# Patient Record
Sex: MALE | Race: WHITE
[De-identification: names, ages, dates, MRNs, and addresses within clinical notes are randomized per-mention and may not be internally consistent; named-entity substitution may affect disease eponyms.]

---

## 2018-01-24 ENCOUNTER — HOSPITAL ENCOUNTER (OUTPATIENT)
Dept: HOSPITAL 47 - RADXRMAIN | Age: 38
Discharge: HOME | End: 2018-01-24
Payer: MEDICARE

## 2018-01-24 DIAGNOSIS — M47.815: Primary | ICD-10-CM

## 2018-01-24 DIAGNOSIS — M46.05: ICD-10-CM

## 2018-01-24 PROCEDURE — 72110 X-RAY EXAM L-2 SPINE 4/>VWS: CPT

## 2018-01-24 PROCEDURE — 72072 X-RAY EXAM THORAC SPINE 3VWS: CPT

## 2018-01-24 NOTE — XR
EXAMINATION TYPE: XR lumbosacral spine min 4V

 

DATE OF EXAM: 1/24/2018

 

CLINICAL HISTORY: Back pain for years.

 

TECHNIQUE: Frontal, lateral, and oblique images of the lumbar spine are obtained.

 

COMPARISON: None

 

FINDINGS:  There are 5 lumbar type vertebral bodies identified.  The lumbar spine shows satisfactory 
alignment without evidence of acute fracture or dislocation. There is mild disc space narrowing and a
nterior spurring T12-L1 level. Vertebral body heights and disk space heights otherwise are within nor
mal limits.  Mild anterior spurring superior anterior L3, L4, and L5 endplates is present The oblique
 images appear within normal limits.  The overlying soft tissue appears unremarkable.

 

IMPRESSION: Mild anterior spurring mid to lower lumbar levels and mild degenerative change T12-L1 lev
el.

## 2018-09-02 NOTE — XR
EXAMINATION TYPE: XR thoracic spine complete

 

DATE OF EXAM: 1/24/2018

 

CLINICAL HISTORY: Back pain for years.

 

TECHNIQUE: Frontal, lateral, and swimmer's view of thoracic spine are obtained.  

 

COMPARISON: None.

 

FINDINGS: Thoracic spine show satisfactory alignment without evidence of acute fracture or dislocatio
n.  Vertebral body heights and disc space heights are preserved. Minimal multilevel anterior spurring
 is seen mid thoracic spine. Visualized ribs are unremarkable bilaterally.   

 

IMPRESSION: Minimal multilevel anterior spurring mid thoracic spine. No

## 2020-10-27 ENCOUNTER — HOSPITAL ENCOUNTER (EMERGENCY)
Dept: HOSPITAL 47 - EC | Age: 40
Discharge: HOME | End: 2020-10-27
Payer: MEDICARE

## 2020-10-27 VITALS — RESPIRATION RATE: 18 BRPM

## 2020-10-27 VITALS — TEMPERATURE: 98 F | SYSTOLIC BLOOD PRESSURE: 139 MMHG | HEART RATE: 67 BPM | DIASTOLIC BLOOD PRESSURE: 99 MMHG
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
## 2020-10-27 DIAGNOSIS — S39.012A: Primary | ICD-10-CM

## 2020-10-27 DIAGNOSIS — F31.9: ICD-10-CM

## 2020-10-27 DIAGNOSIS — Z79.899: ICD-10-CM
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
## 2020-10-27 DIAGNOSIS — I10: ICD-10-CM

## 2020-10-27 PROCEDURE — 96372 THER/PROPH/DIAG INJ SC/IM: CPT

## 2020-10-27 PROCEDURE — 99283 EMERGENCY DEPT VISIT LOW MDM: CPT

## 2020-10-27 PROCEDURE — 72110 X-RAY EXAM L-2 SPINE 4/>VWS: CPT

## 2020-10-27 NOTE — ED
Back Pain HPI





- General


Chief Complaint: Back Pain/Injury


Stated Complaint: Back pain


Time Seen by Provider: 10/27/20 15:14


Source: patient, family, RN notes reviewed


Limitations: no limitations





- History of Present Illness


Initial Comments: 





This is a 40-year-old male presents emergency Department chief complaint of back

pain 2 days.  Patient states he was working on his car states that he spent 

over pushing her ramp coming to tired when he felt his back pop.  He states he 

had immediate symptoms radiate down his left leg.  Denies any bowel, bladder 

cancer retention.  Denies any difficulty and bleeding.  He states that he has 

pain areas on his leg intermittently.  Denies any saddle anesthesias.  Patient 

has chronic back issues in which she has had multiple injections and has been 

seen by Dr. Gaitan.  Patient states that he has not been referred to Dr. Albert 

for pain management.  Patient has no mental abdominal pain.  Denies any dysuria 

hematuria no fevers or chills.





- Related Data


                                Home Medications











 Medication  Instructions  Recorded  Confirmed


 


Lithium Carbonate 600 mg PO BID 08/01/15 08/01/15


 


Omeprazole [PriLOSEC] 20 mg PO AC-BID 08/01/15 08/01/15








                                  Previous Rx's











 Medication  Instructions  Recorded


 


Cephalexin [Keflex] 500 mg PO Q6HR #32 cap 08/01/15


 


Ibuprofen [Motrin] 800 mg PO Q8HR PRN #20 tab 08/01/15


 


HYDROcodone/APAP 7.5-325MG [Norco 1 tab PO Q6HR PRN 3 Days #12 tab 10/27/20





7.5-325]  


 


Ibuprofen [Motrin] 800 mg PO Q6HR #20 tab 10/27/20


 


predniSONE 50 mg PO DAILY #5 tab 10/27/20











                                    Allergies











Allergy/AdvReac Type Severity Reaction Status Date / Time


 


No Known Allergies Allergy   Verified 10/27/20 15:07














Review of Systems


ROS Statement: 


Those systems with pertinent positive or pertinent negative responses have been 

documented in the HPI.





ROS Other: All systems not noted in ROS Statement are negative.





Past Medical History


Past Medical History: No Reported History


History of Any Multi-Drug Resistant Organisms: None Reported


Past Surgical History: No Surgical Hx Reported


Past Psychological History: Bipolar, Depression


Smoking Status: Current some day smoker


Past Alcohol Use History: Occasional


Past Drug Use History: None Reported





General Exam


Limitations: no limitations


General appearance: alert, in no apparent distress


Head exam: Present: atraumatic, normocephalic, normal inspection


Eye exam: Present: normal appearance, PERRL, EOMI.  Absent: scleral icterus, 

conjunctival injection, periorbital swelling


ENT exam: Present: normal exam, mucous membranes moist


Neck exam: Present: normal inspection, full ROM.  Absent: tenderness, 

meningismus, lymphadenopathy


Respiratory exam: Present: normal lung sounds bilaterally.  Absent: respiratory 

distress, wheezes, rales, rhonchi, stridor


Cardiovascular Exam: Present: regular rate, normal rhythm, normal heart sounds. 

 Absent: systolic murmur, diastolic murmur, rubs, gallop, clicks


GI/Abdominal exam: Present: soft, normal bowel sounds.  Absent: distended, 

tenderness, guarding, rebound, rigid


Extremities exam: Present: other (Lower extremity strength equal bilaterally, 

equal color equal warmth neurovascular intact,.)


Back exam: Present: normal inspection, full ROM (Mild discomfort), tenderness 

(Left lower lumbar), paraspinal tenderness.  Absent: CVA tenderness (R), CVA 

tenderness (L), vertebral tenderness


Neurological exam: Present: reflexes normal.  Absent: motor sensory deficit





Course


                                   Vital Signs











  10/27/20 10/27/20





  15:05 15:48


 


Temperature 98.7 F 


 


Pulse Rate 100 85


 


Respiratory 18 18





Rate  


 


Blood Pressure 144/111 145/102


 


O2 Sat by Pulse 99 98





Oximetry  














- Reevaluation(s)


Reevaluation #1: 





10/27/20 15:43


Patient's vitals reviewed, patient is mildly hypertensive, patient did not take 

his lisinopril.





Medical Decision Making





- Medical Decision Making





X-ray shows no acute changes patient is degenerative changes nose patient has no

 red flag symptoms.  Patient discharged in stable condition.





Disposition


Clinical Impression: 


 Strain of lumbar region





Disposition: HOME SELF-CARE


Condition: Stable


Instructions (If sedation given, give patient instructions):  Acute Low Back 

Pain (ED)


Additional Instructions: 


Please return to the Emergency Department if symptoms worsen or any other 

concerns.


Prescriptions: 


Ibuprofen [Motrin] 800 mg PO Q6HR #20 tab


HYDROcodone/APAP 7.5-325MG [Norco 7.5-325] 1 tab PO Q6HR PRN 3 Days #12 tab


 PRN Reason: pain


predniSONE 50 mg PO DAILY #5 tab


Is patient prescribed a controlled substance at d/c from ED?: Yes


When asked, does pt state using other controlled substances?: No


If prescribed controlled substance>3 days was MAPS reviewed?: Prescribed <3 Days


If opioid is for acute pain is fill amount 7 days or less?: Yes


If Rx opioid, was Start Talking consent form obtained?: Yes


Referrals: 


[facility] [Primary Care Provider] - 1-2 days


Hakeem Ferreira DO [Doctor of Osteopathic Medicine] - 1-2 days


Time of Disposition: 16:37

## 2020-10-27 NOTE — XR
EXAMINATION TYPE: XR lumbosacral spine min 4V

 

DATE OF EXAM: 10/27/2020

 

CLINICAL HISTORY: Extreme low back pain after bending injury.

 

TECHNIQUE: Frontal, lateral, and oblique images of the lumbar spine are obtained.

 

COMPARISON: Lumbar spine x-ray January 24, 2018.

 

FINDINGS:  There are 5 lumbar type vertebral bodies redemonstrated.  The lumbar spine shows satisfact
ory alignment without evidence of acute fracture or dislocation. Vertebral body heights and disk spac
e heights remain within normal limits.  Persistent mild anterior spur superior L4 endplate and modera
te anterior spur superior L5 endplate. The oblique images appear within normal limits.  The overlying
 soft tissue appears unremarkable.

 

IMPRESSION: As above. No significant change from prior.

## 2020-10-29 ENCOUNTER — HOSPITAL ENCOUNTER (EMERGENCY)
Dept: HOSPITAL 47 - EC | Age: 40
Discharge: HOME | End: 2020-10-29
Payer: MEDICARE

## 2020-10-29 VITALS
DIASTOLIC BLOOD PRESSURE: 94 MMHG | TEMPERATURE: 97.8 F | HEART RATE: 87 BPM | RESPIRATION RATE: 18 BRPM | SYSTOLIC BLOOD PRESSURE: 156 MMHG
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
## 2020-10-29 DIAGNOSIS — F17.200: ICD-10-CM

## 2020-10-29 DIAGNOSIS — Z79.899: ICD-10-CM

## 2020-10-29 DIAGNOSIS — S39.012A: Primary | ICD-10-CM

## 2020-10-29 PROCEDURE — 96372 THER/PROPH/DIAG INJ SC/IM: CPT

## 2020-10-29 PROCEDURE — 99283 EMERGENCY DEPT VISIT LOW MDM: CPT

## 2020-10-29 NOTE — ED
Back Pain HPI





- General


Chief Complaint: Back Pain/Injury


Stated Complaint: Strain to lumbar


Time Seen by Provider: 10/29/20 14:47


Source: patient





- History of Present Illness


Initial Comments: 





Patient is a 40-year-old male with history of chronic back pain presenting to 

the emergency department with a chief complaint of back pain.  Patient does 

report history of chronic back pain that was exacerbated about 3 years ago when 

he attempted to work on his car and felt a pop.  Patient states he was in the 

emergency department 3 days ago and was started on prednisone and given 

hydrocodone for pain.  Patient reports the pain has a manageable and he has not 

been able to get into an orthopedic specialist due to insurance purposes.  

States he also spoke with his primary care physician who advised him to come to 

the emergency department.  Patient reports she has an appointment scheduled with

a pain management clinic in January but they could not see him any sooner.  

Denies any saddle anesthesia, urinary retention with overflow incontinence or 

bowel incontinence.  Denies abdominal or chest pain.





- Related Data


                                Home Medications











 Medication  Instructions  Recorded  Confirmed


 


Lithium Carbonate 600 mg PO BID 08/01/15 08/01/15


 


Omeprazole [PriLOSEC] 20 mg PO AC-BID 08/01/15 08/01/15








                                  Previous Rx's











 Medication  Instructions  Recorded


 


Cephalexin [Keflex] 500 mg PO Q6HR #32 cap 08/01/15


 


Ibuprofen [Motrin] 800 mg PO Q8HR PRN #20 tab 08/01/15


 


HYDROcodone/APAP 7.5-325MG [Norco 1 tab PO Q6HR PRN 3 Days #12 tab 10/27/20





7.5-325]  


 


Ibuprofen [Motrin] 800 mg PO Q6HR #20 tab 10/27/20


 


predniSONE 50 mg PO DAILY #5 tab 10/27/20


 


Ibuprofen [Motrin] 600 mg PO Q8HR PRN #30 tab 10/29/20


 


Lidocaine 5% Patch [Lidoderm] 1 patch TOPICAL DAILY #6 patch 10/29/20











                                    Allergies











Allergy/AdvReac Type Severity Reaction Status Date / Time


 


No Known Allergies Allergy   Verified 10/29/20 14:44














Review of Systems


ROS Statement: 


Those systems with pertinent positive or pertinent negative responses have been 

documented in the HPI.





ROS Other: All systems not noted in ROS Statement are negative.





Past Medical History


Past Medical History: No Reported History


History of Any Multi-Drug Resistant Organisms: None Reported


Past Surgical History: No Surgical Hx Reported


Additional Past Surgical History / Comment(s): Dental


Past Psychological History: Bipolar, Depression


Smoking Status: Current every day smoker


Past Alcohol Use History: Occasional


Past Drug Use History: Marijuana





General Exam


Limitations: no limitations


General appearance: alert, in no apparent distress, obese


Head exam: Present: atraumatic, normocephalic, normal inspection


Eye exam: Present: normal appearance, PERRL, EOMI


Pupils: Present: normal accommodation


ENT exam: Present: normal exam, normal oropharynx, mucous membranes moist, TM's 

normal bilaterally, normal external ear exam


Neck exam: Present: normal inspection, full ROM.  Absent: tenderness


Respiratory exam: Present: normal lung sounds bilaterally.  Absent: respiratory 

distress, wheezes, rales


Cardiovascular Exam: Present: regular rate, normal rhythm, normal heart sounds


GI/Abdominal exam: Present: soft.  Absent: distended, tenderness, guarding, 

rebound


Extremities exam: Present: normal inspection, full ROM, normal capillary refill.

  Absent: tenderness


Back exam: Present: normal inspection, full ROM, tenderness, paraspinal 

tenderness (Lumbosacral tenderness.  Positive leg raise test bilaterally).  

Absent: CVA tenderness (R), CVA tenderness (L)


Neurological exam: Present: alert, oriented X3


Psychiatric exam: Present: normal affect, normal mood


Skin exam: Present: warm, dry, intact, normal color





Course


                                   Vital Signs











  10/29/20





  14:42


 


Temperature 97.8 F


 


Pulse Rate 87


 


Respiratory 18





Rate 


 


Blood Pressure 156/94


 


O2 Sat by Pulse 98





Oximetry 














Medical Decision Making





- Medical Decision Making





Patient is a 40-year-old male with acute on chronic back pain.  Patient was 

advised to continue taking the prednisone which she still has 2 doses left.  No 

Cauda equina.  He was advised to attend physical therapy and follow-up with 

them..  She was given Tylenol 3 starter pack.  He was given Toradol in the ED 

and a Lidoderm patch.  He will be discharged with Lidoderm patches.  Strict 

return parameters were thoroughly discussed the patient was on standing 

agreeable.  Case discussed with physician.





Disposition


Clinical Impression: 


 Mechanical back pain, Strain of lumbar region





Disposition: HOME SELF-CARE


Condition: Stable


Instructions (If sedation given, give patient instructions):  Acute Low Back 

Pain (ED), Low Back Strain (ED)


Additional Instructions: 


Take prescribed medication as directed.  Follow up with her primary care 

physician.  Return to emergency department if symptoms worsen.


Prescriptions: 


Lidocaine 5% Patch [Lidoderm] 1 patch TOPICAL DAILY #6 patch


Ibuprofen [Motrin] 600 mg PO Q8HR PRN #30 tab


 PRN Reason: Pain


Is patient prescribed a controlled substance at d/c from ED?: No


Referrals: 


[facility] [Primary Care Provider] - 1-2 days


Time of Disposition: 15:24

## 2022-04-21 ENCOUNTER — HOSPITAL ENCOUNTER (OUTPATIENT)
Dept: HOSPITAL 47 - RADECHMAIN | Age: 42
Discharge: HOME | End: 2022-04-21
Attending: FAMILY MEDICINE
Payer: MEDICARE

## 2022-04-21 DIAGNOSIS — R60.9: Primary | ICD-10-CM

## 2022-04-21 PROCEDURE — 93017 CV STRESS TEST TRACING ONLY: CPT

## 2022-04-21 PROCEDURE — 93306 TTE W/DOPPLER COMPLETE: CPT

## 2022-04-21 NOTE — P.STRESS
- Stress Test Note


Stress Test Results/Findings: 


Exam Performed:  stress test


Exam Date: 04/21/22


Reason for Exam: EDEMA, CHEST PAIN





Height: 5 ft 9 in


Weight: 111.4 kg


Protocol: CASS


Stage: 3


Duration of Exercise: 9:20





Resting Heart Rate: 79


Resting Blood Pressure: 108/86


Maximum Achieved Heart Rate: 137


Maximum Achieved Blood Pressure: 179/89


85% PMHR: 152


100% PMHR: 179


METS: 10.9


Technologist Comment: 





Stress Test Results/Findings: 


This is a 42-year-old gentleman with history of hypertension and smoking being 

evaluated symptoms of chest pain.





Stress data: Baseline EKG showed sinus rhythm with mild nonspecific ST-T 

changes.  Blood pressure at rest is 108/86 with pulse rate of 79.  Patient 

walked on the Cass protocol for 9 minutes and 20 seconds achieving a maximum 

rate of 137 with a blood pressure 179/89.  EKGs taken during and after exercise 

did not reveal significant changes from the baseline.  Patient did not 

experience any chest pain.





Final impression #1.  Negative stress test #2.  Good exercise capacity #3.  

Patient did not experience any chest pain #4.  The test was stopped because of 

shortness of breath.  #5.  No arrhythmias noted.

## 2022-04-21 NOTE — CA
Transthoracic Echo Report 

 Name: Javed Rosales 

 MRN:    [id number] 

 Age:    42     Gender:     M 

 

 :    1980 

 Exam Date:     2022 08:43 

 Exam Location: [facility] 

 Ht (in):     69     Wt (lb):     245 

 Ordering Physician:        [facility] 

 Attending/Referring Phys:         Nadia Frey   

                                   [facility] 

 Technician         Lani Angela RDCS 

 Procedure CPT: 

 Indications:       R60.9 EDEMA 

 

 Cardiac Hx: 

 Technical Quality:      Good 

 Contrast 1:                                Total Dose (mL): 

 Contrast 2:                                Total Dose (mL): 

 

 MEASUREMENTS  (Male / Female) Normal Values 

 2D ECHO 

 LV Diastolic Diameter PLAX        5.0 cm                4.2 - 5.9 / 3.9 - 5.3 cm 

 LV Systolic Diameter PLAX         3.4 cm                 

 IVS Diastolic Thickness           1.1 cm                0.6 - 1.0 / 0.6 - 0.9 cm 

 LVPW Diastolic Thickness          1.4 cm                0.6 - 1.0 / 0.6 - 0.9 cm 

 LV Relative Wall Thickness        0.5                    

 RV Internal Dim ED PLAX           3.5 cm                 

 LA Volume                         32.6 cm              18 - 58 / 22 - 52 cm 

 

 M-MODE 

 Aortic Root Diameter MM           3.4 cm                 

 LA Systolic Diameter MM           3.2 cm                 

 LA Ao Ratio MM                    1.0                    

 MV E Point Septal Separation      1.4 cm                 

 AV Cusp Separation MM             2.2 cm                 

 

 DOPPLER 

 AV Peak Velocity                  120.6 cm/s             

 AV Peak Gradient                  5.8 mmHg               

 MV Area PHT                       3.6 cm                

 Mitral E Point Velocity           63.5 cm/s              

 Mitral A Point Velocity           67.7 cm/s              

 Mitral E to A Ratio               0.9                    

 MV Deceleration Time              210.3 ms               

 MV E' Velocity                    5.1 cm/s               

 Mitral E to MV E' Ratio           12.4                   

 TR Peak Velocity                  131.0 cm/s             

 TR Peak Gradient                  6.9 mmHg               

 Right Ventricular Systolic Press  11.4 mmHg              

 

 

 FINDINGS 

 Left Ventricle 

 Mildly increased septal wall thickness. Left ventricular ejection fraction is  

 estimated at 50-55 %. Normal left ventricular systolic function with no obvious  

 regional wall motion abnormalities. Normal left ventricular diastolic filling  

 pattern. 

 

 Right Ventricle 

 Normal right ventricular size and function. Right ventricular systolic pressure  

 within normal limits. 

 

 Right Atrium 

 Normal right atrial size. 

 

 Left Atrium 

 Normal left atrial size and volume 

 

 Mitral Valve 

 Structurally normal mitral valve. Trace mitral regurgitation. 

 

 Aortic Valve 

 Trileaflet aortic valve. No aortic regurgitation. 

 

 Tricuspid Valve 

 Structurally normal tricuspid valve. Trace tricuspid regurgitation. 

 

 Pulmonic Valve 

 Structurally normal pulmonic valve. No pulmonic regurgitation. 

 

 Pericardium 

 Normal pericardium. 

 

 Aorta 

 Normal size aortic root and proximal ascending aorta. 

 

 CONCLUSIONS 

 #1.  Mild septal hypertrophy.  Left ankle function is fair with an ejection  

 fraction of 50-55%.  Left ventricle size is normal. 

 #2.  Trace mitral regurgitation and tricuspid regurgitation. 

 #3.  Normal chamber sizes. 

 #4.  No pericardial effusion 

 Previewed by:  

 Dr. Carin Gonzales MD 

 (Electronically Signed) 

 Final Date:      2022 10:04

## 2022-04-22 NOTE — EST
Stress Test Results/Findings: 

Exam Performed:  stress test

Exam Date: 04/21/22

Reason for Exam: EDEMA, CHEST PAIN



Height: 5 ft 9 in

Weight: 111.4 kg

Protocol: CASS

Stage: 3

Duration of Exercise: 9:20



Resting Heart Rate: 79

Resting Blood Pressure: 108/86

Maximum Achieved Heart Rate: 137

Maximum Achieved Blood Pressure: 179/89

85% PMHR: 152

100% PMHR: 179

METS: 10.9

Technologist Comment: 



Stress Test Results/Findings: 

This is a 42-year-old gentleman with history of hypertension and smoking being 
evaluated symptoms of chest pain.



Stress data: Baseline EKG showed sinus rhythm with mild nonspecific ST-T 
changes.  Blood pressure at rest is 108/86 with pulse rate of 79.  Patient 
walked on the Cass protocol for 9 minutes and 20 seconds achieving a maximum 
rate of 137 with a blood pressure 179/89.  EKGs taken during and after exercise 
did not reveal significant changes from the baseline.  Patient did not 
experience any chest pain.



Final impression #1.  Negative stress test #2.  Good exercise capacity #3.  
Patient did not experience any chest pain #4.  The test was stopped because of 
shortness of breath.  #5.  No arrhythmias noted.

MTDD